# Patient Record
Sex: MALE | URBAN - METROPOLITAN AREA
[De-identification: names, ages, dates, MRNs, and addresses within clinical notes are randomized per-mention and may not be internally consistent; named-entity substitution may affect disease eponyms.]

---

## 2018-03-07 ENCOUNTER — APPOINTMENT (RX ONLY)
Dept: URBAN - METROPOLITAN AREA CLINIC 93 | Facility: CLINIC | Age: 48
Setting detail: DERMATOLOGY
End: 2018-03-07

## 2018-03-07 DIAGNOSIS — L738 OTHER SPECIFIED DISEASES OF HAIR AND HAIR FOLLICLES: ICD-10-CM

## 2018-03-07 DIAGNOSIS — L73.9 FOLLICULAR DISORDER, UNSPECIFIED: ICD-10-CM

## 2018-03-07 DIAGNOSIS — L98.8 OTHER SPECIFIED DISORDERS OF THE SKIN AND SUBCUTANEOUS TISSUE: ICD-10-CM

## 2018-03-07 DIAGNOSIS — L663 OTHER SPECIFIED DISEASES OF HAIR AND HAIR FOLLICLES: ICD-10-CM

## 2018-03-07 PROBLEM — L70.0 ACNE VULGARIS: Status: ACTIVE | Noted: 2018-03-07

## 2018-03-07 PROBLEM — L02.821 FURUNCLE OF HEAD [ANY PART, EXCEPT FACE]: Status: ACTIVE | Noted: 2018-03-07

## 2018-03-07 PROCEDURE — ? PRESCRIPTION

## 2018-03-07 PROCEDURE — ? COUNSELING

## 2018-03-07 PROCEDURE — 99213 OFFICE O/P EST LOW 20 MIN: CPT

## 2018-03-07 RX ORDER — KETOCONAZOLE 20.5 MG/ML
SHAMPOO, SUSPENSION TOPICAL
Qty: 1 | Refills: 5 | Status: ERX | COMMUNITY
Start: 2018-03-07

## 2018-03-07 RX ORDER — SODIUM SULFACETAMIDE AND SULFUR 80; 40 MG/ML; MG/ML
LOTION TOPICAL
Qty: 1 | Refills: 2 | COMMUNITY
Start: 2018-03-07

## 2018-03-07 RX ORDER — CLINDAMYCIN PHOSPHATE 10 MG/ML
LOTION TOPICAL QD
Qty: 1 | Refills: 3 | Status: CANCELLED

## 2018-03-07 RX ADMIN — KETOCONAZOLE: 20.5 SHAMPOO, SUSPENSION TOPICAL at 14:09

## 2018-03-07 RX ADMIN — SODIUM SULFACETAMIDE AND SULFUR: 80; 40 LOTION TOPICAL at 16:09

## 2018-03-07 ASSESSMENT — LOCATION ZONE DERM
LOCATION ZONE: FACE
LOCATION ZONE: SCALP

## 2018-03-07 ASSESSMENT — LOCATION SIMPLE DESCRIPTION DERM
LOCATION SIMPLE: POSTERIOR SCALP
LOCATION SIMPLE: LEFT CHEEK

## 2018-03-07 ASSESSMENT — LOCATION DETAILED DESCRIPTION DERM
LOCATION DETAILED: LEFT INFERIOR CENTRAL MALAR CHEEK
LOCATION DETAILED: RIGHT SUPERIOR OCCIPITAL SCALP

## 2018-03-07 NOTE — PROCEDURE: COUNSELING
Detail Level: Zone
Patient Specific Counseling (Will Not Stick From Patient To Patient): Continue with diprolene and clindamycin spot tx if new lesions only. Nizoral for maintenance
Patient Specific Counseling (Will Not Stick From Patient To Patient): Get OC-8 online

## 2018-08-08 ENCOUNTER — APPOINTMENT (RX ONLY)
Dept: URBAN - METROPOLITAN AREA CLINIC 93 | Facility: CLINIC | Age: 48
Setting detail: DERMATOLOGY
End: 2018-08-08

## 2018-08-08 DIAGNOSIS — L70.0 ACNE VULGARIS: ICD-10-CM

## 2018-08-08 DIAGNOSIS — L663 OTHER SPECIFIED DISEASES OF HAIR AND HAIR FOLLICLES: ICD-10-CM | Status: WELL CONTROLLED

## 2018-08-08 DIAGNOSIS — L738 OTHER SPECIFIED DISEASES OF HAIR AND HAIR FOLLICLES: ICD-10-CM | Status: WELL CONTROLLED

## 2018-08-08 DIAGNOSIS — L73.9 FOLLICULAR DISORDER, UNSPECIFIED: ICD-10-CM | Status: WELL CONTROLLED

## 2018-08-08 PROBLEM — L02.821 FURUNCLE OF HEAD [ANY PART, EXCEPT FACE]: Status: ACTIVE | Noted: 2018-08-08

## 2018-08-08 PROCEDURE — ? COUNSELING

## 2018-08-08 PROCEDURE — ? PRESCRIPTION

## 2018-08-08 PROCEDURE — 99213 OFFICE O/P EST LOW 20 MIN: CPT

## 2018-08-08 RX ORDER — CLINDAMYCIN AND BENZOYL PEROXIDE GEL 1 %-5 %
KIT TOPICAL
Qty: 1 | Refills: 2 | Status: ERX | COMMUNITY
Start: 2018-08-08

## 2018-08-08 RX ORDER — CLINDAMYCIN PHOSPHATE 10 MG/ML
LOTION TOPICAL QD
Qty: 1 | Refills: 3 | Status: ERX | COMMUNITY
Start: 2018-08-08

## 2018-08-08 RX ORDER — BETAMETHASONE DIPROPIONATE 0.5 MG/G
LOTION TOPICAL
Qty: 1 | Refills: 2 | Status: ERX | COMMUNITY
Start: 2018-08-08

## 2018-08-08 RX ORDER — SULFACETAMIDE SODIUM AND SULFUR 10; 5 MG/G; MG/G
RINSE TOPICAL
Qty: 1 | Refills: 3 | Status: ERX | COMMUNITY
Start: 2018-08-08

## 2018-08-08 RX ORDER — KETOCONAZOLE 20.5 MG/ML
SHAMPOO, SUSPENSION TOPICAL
Qty: 1 | Refills: 5 | Status: ERX

## 2018-08-08 RX ADMIN — CLINDAMYCIN PHOSPHATE: 10 LOTION TOPICAL at 13:21

## 2018-08-08 RX ADMIN — BETAMETHASONE DIPROPIONATE: 0.5 LOTION TOPICAL at 13:24

## 2018-08-08 RX ADMIN — SULFACETAMIDE SODIUM AND SULFUR: 10; 5 RINSE TOPICAL at 13:30

## 2018-08-08 RX ADMIN — CLINDAMYCIN AND BENZOYL PEROXIDE GEL: KIT at 13:31

## 2018-08-08 ASSESSMENT — LOCATION ZONE DERM: LOCATION ZONE: SCALP

## 2018-08-08 ASSESSMENT — LOCATION SIMPLE DESCRIPTION DERM: LOCATION SIMPLE: POSTERIOR SCALP

## 2018-08-08 ASSESSMENT — LOCATION DETAILED DESCRIPTION DERM: LOCATION DETAILED: LEFT OCCIPITAL SCALP
